# Patient Record
Sex: MALE | Race: WHITE
[De-identification: names, ages, dates, MRNs, and addresses within clinical notes are randomized per-mention and may not be internally consistent; named-entity substitution may affect disease eponyms.]

---

## 2017-04-21 ENCOUNTER — HOSPITAL ENCOUNTER (EMERGENCY)
Dept: HOSPITAL 80 - CED | Age: 47
Discharge: HOME | End: 2017-04-21
Payer: SELF-PAY

## 2017-04-21 VITALS
OXYGEN SATURATION: 93 % | RESPIRATION RATE: 18 BRPM | SYSTOLIC BLOOD PRESSURE: 100 MMHG | DIASTOLIC BLOOD PRESSURE: 65 MMHG | HEART RATE: 86 BPM | TEMPERATURE: 98.6 F

## 2017-04-21 DIAGNOSIS — J18.1: Primary | ICD-10-CM

## 2017-04-21 DIAGNOSIS — Z72.0: ICD-10-CM

## 2017-04-21 NOTE — UCPHY
H & P


Time Seen by Provider: 04/21/17 14:00


Patient Type: New


HPI/ROS: 





CHIEF COMPLAINT:  Cough





HISTORY OF PRESENT ILLNESS:  Patient is a 47-year-old male who presents to the 

emergency department with 2 weeks of worsening cough.  Patient was a [facility] 2 weeks ago after having an alcohol withdrawal seizure and a 

fall.  Patient has not had alcohol since his discharge 2 weeks ago.  He has had 

a persistent cough that is worsening.  He denies chest pain.  Mild shortness of 

breath. No measured fever.  He has no leg pain or swelling.





REVIEW OF SYSTEMS:  


My complete review of systems is negative except as mentioned in the HPI.


Past Medical/Surgical History: 





Includes alcohol abuse, alcohol withdrawal seizure





Past surgical history:  Noncontributory





Social history:  The patient smokes cigarettes


Smoking Status: Current every day smoker


Physical Exam: 





Vitals noted.  O2 saturation 94%


GENERAL:  Well-appearing, in no acute distress, alert.


HEENT:  Eyes normal to inspection, normal pharynx, no signs of dehydration.


NECK:  No thyromegaly, no lymphadenopathy, supple.


RESPIRATORY:  Clear to auscultation bilaterally, no rales, rhonchi or wheezing.


CVS:  Regular rate and rhythm, no rubs, murmurs, or gallops.


ABDOMEN:  Soft, nontender, nondistended, no organomegaly.


BACK:  Normal to inspection, no CVA tenderness.


SKIN:  Normal color, no rash, warm, dry.  No pallor.


EXTREMITIES:  No pedal edema, no calf tenderness, no Homans sign or cords, no 

joint swelling.


NEURO/PSYCH:  Alert and oriented , normal mood and affect, normal motor sensory 

exam.  


Constitutional: 


 Initial Vital Signs











Temperature (C)  36.6 C   04/21/17 13:53


 


Heart Rate  90   04/21/17 13:53


 


Respiratory Rate  15   04/21/17 13:53


 


Blood Pressure  108/60   04/21/17 13:53


 


O2 Sat (%)  94   04/21/17 13:53








 











O2 Delivery Mode               Room Air














Allergies/Adverse Reactions: 


 





No Known Allergies Allergy (Unverified 04/21/17 13:54)


 








Home Medications: 














 Medication  Instructions  Recorded


 


levOFLOXACIN [Levaquin] 750 mg PO DAILY #10 tab 04/21/17














Medical Decision Making





- Diagnostics


Imaging Results: 


 Imaging Impressions





Chest X-Ray  04/21/17 14:09


Impression: Dense pneumonia occupying the superior segment of the left lower 

lobe. Clinical correlation and follow-up to assure complete resolution are 

strongly recommended.


 


A Follow-Up Required test result has been communicated via the [facility] Critical Result system on 4/21/2017 14:25, Message ID [id number].











ED Course/Re-evaluation: 





I discussed possible etiologies with the patient.  Answered all his questions.  

Chest x-ray was ordered.





Chest x-ray:  Patient has a dense infiltrate of the superior left lower lobe.





I discussed the results with the patient.  I answered all his questions.  He 

was given Levaquin 750 mg orally prior to d/c.  Patient was given a 

prescription for Levaquin x 14 days.  He was instructed that he needs close 

follow-up to ensure that his pneumonia resolved.  He may need repeat chest x-

ray imaging.  He is aware this.  He was given follow-up with primary care 

physician.  He is aware he can return to the Urgent Care.  Patient was given 

warnings prior to leaving.  He will return with worsening symptoms.


Differential Diagnosis: 





My differential includes but is not limited to bronchitis, pneumonia, empyema, 

reactive airway disease, COPD, bacteremia, aspiration





Departure





- Departure


Disposition: Home, Routine, Self-Care


Clinical Impression: 


 Cough





Pneumonia


Qualifiers:


 Pneumonia type: due to unspecified organism Laterality: left Lung location: 

lower lobe of lung Qualified Code(s): J18.1 - Lobar pneumonia, unspecified 

organism





Condition: Good


Instructions:  Pneumonia (ED)


Additional Instructions: 


You have left-sided pneumonia.  You have been given antibiotics.  Take the 

entire course of antibiotics.  He needs close follow-up for recheck in 5-10 

days.  Return sooner if her symptoms worsen.


Referrals: 


Juliet Ponce MD [Medical Doctor] - 5-7 days, call for appt.


Prescriptions: 


levOFLOXACIN [Levaquin] 750 mg PO DAILY #10 tab





- PQRS


PQRS Measurement: 








My PQRS negative my PQRS negative my PQRS negative my PQRS negative


134:   Depression screening and followup, PRIME MD-PHQ2  (12 years and older)


Over the last 2 weeks, how often have you been bothered by any of the following 

problems? 


 1. Feeling down, depressed, or hopeless?


2. Little interest or pleasure in doing things? 


Patient answered no to both 1 and 2





130:  Documentation of medications.  


Reviewed all patient medications, doses, route and frequency.








226:  Do you smoke?


Yes.  The patient has been instructed to stop smoking.

## 2017-05-20 ENCOUNTER — HOSPITAL ENCOUNTER (INPATIENT)
Dept: HOSPITAL 80 - CED | Age: 47
LOS: 4 days | Discharge: HOME | DRG: 871 | End: 2017-05-24
Attending: FAMILY MEDICINE | Admitting: INTERNAL MEDICINE
Payer: SELF-PAY

## 2017-05-20 DIAGNOSIS — F10.21: ICD-10-CM

## 2017-05-20 DIAGNOSIS — A41.9: Primary | ICD-10-CM

## 2017-05-20 DIAGNOSIS — J18.8: ICD-10-CM

## 2017-05-20 DIAGNOSIS — J85.1: ICD-10-CM

## 2017-05-20 LAB
% IMMATURE GRANULYOCYTES: 0.9 % (ref 0–1.1)
ABSOLUTE IMMATURE GRANULOCYTES: 0.25 10^3/UL (ref 0–0.1)
ABSOLUTE NRBC COUNT: 0 10^3/UL (ref 0–0.01)
ADD DIFF?: NO
ADD MORPH?: NO
ADD SCAN?: NO
ALBUMIN SERPL-MCNC: 2.6 G/DL (ref 3.5–5)
ALP SERPL-CCNC: 157 IU/L (ref 38–126)
ALT SERPL-CCNC: 28 IU/L (ref 21–72)
ANION GAP SERPL CALC-SCNC: 14 MEQ/L (ref 8–16)
APTT BLD: 44.4 SEC (ref 23–38)
AST SERPL-CCNC: 18 IU/L (ref 17–59)
ATYPICAL LYMPHOCYTE FLAG: 10 (ref 0–99)
BILIRUB SERPL-MCNC: 0.6 MG/DL (ref 0.1–1.4)
CALCIUM SERPL-MCNC: 8.4 MG/DL (ref 8.5–10.4)
CHLORIDE SERPL-SCNC: 96 MEQ/L (ref 97–110)
CO2 SERPL-SCNC: 24 MEQ/L (ref 22–31)
CREAT SERPL-MCNC: 0.9 MG/DL (ref 0.7–1.3)
ERYTHROCYTE [DISTWIDTH] IN BLOOD BY AUTOMATED COUNT: 13.9 % (ref 11.5–15.2)
FRAGMENT RBC FLAG: 0 (ref 0–99)
GFR SERPL CREATININE-BSD FRML MDRD: > 60 ML/MIN/{1.73_M2}
GLUCOSE SERPL-MCNC: 85 MG/DL (ref 70–100)
HCT VFR BLD CALC: 29.1 % (ref 40–51)
HGB BLD-MCNC: 9.7 G/DL (ref 13.7–17.5)
INR PPP: 1.24 (ref 0.83–1.16)
LEFT SHIFT FLG: 40 (ref 0–99)
LIPEMIA HEMOLYSIS FLAG: 80 (ref 0–99)
MCH RBC BLDCO QN: 30.6 PG (ref 27.9–34.1)
MCHC RBC AUTO-ENTMCNC: 33.3 G/DL (ref 32.4–36.7)
MCV RBC AUTO: 91.8 FL (ref 81.5–99.8)
NRBC-AUTO%: 0 % (ref 0–0.2)
PLATELET # BLD EST: (no result) 10*3/UL
PLATELET # BLD: 848 10^3/UL (ref 150–400)
PLATELET CLUMPS FLAG: 0 (ref 0–99)
PMV BLD AUTO: 8.6 FL (ref 8.7–11.7)
POTASSIUM SERPL-SCNC: 4.6 MEQ/L (ref 3.5–5.2)
PROT SERPL-MCNC: 6.1 G/DL (ref 6.3–8.2)
PROTHROMBIN TIME: 15.3 SEC (ref 12–15)
RBC # BLD AUTO: 3.17 10^6/UL (ref 4.4–6.38)
SODIUM SERPL-SCNC: 134 MEQ/L (ref 134–144)

## 2017-05-20 PROCEDURE — C1751 CATH, INF, PER/CENT/MIDLINE: HCPCS

## 2017-05-20 RX ADMIN — IPRATROPIUM BROMIDE AND ALBUTEROL SULFATE SCH ML: .5; 3 SOLUTION RESPIRATORY (INHALATION) at 21:32

## 2017-05-20 RX ADMIN — AMPICILLIN AND SULBACTAM SCH MLS: 2; 1 INJECTION, POWDER, FOR SOLUTION INTRAMUSCULAR; INTRAVENOUS at 23:33

## 2017-05-20 RX ADMIN — ZOLPIDEM TARTRATE PRN MG: 5 TABLET ORAL at 23:59

## 2017-05-20 RX ADMIN — ACETAMINOPHEN PRN MG: 325 TABLET ORAL at 23:58

## 2017-05-20 NOTE — GHP
[f rep st]



                                                            HISTORY AND PHYSICAL





DATE OF ADMISSION:  05/20/2017



CHIEF COMPLAINT:  Shortness of breath, generalized malaise, and fatigue.



HISTORY:  This is a 47-year-old man who has a past medical history of alcohol and polysubstance abus
e, mostly sounding to be in remission, who presents with shortness of breath, generalized malaise, a
nd fatigue going on for at least a couple weeks.  He notes that about 1 month ago he was diagnosed w
ith pneumonia; that was on April 21.  He had a dense lower lobe infiltrate appreciated at that time 
when he was seen in the emergency department.  He was sent home with a 10-day course of Levaquin, wh
ich he completed.  He notes that he initially felt better but then, within several days to a week la
ter, began to feel worse again.  He has had some fevers, though he has not taken his temperature at 
home.  He has had some night sweats.  He has a productive cough with what he describes as brownish w
syed sputum that leaves a funny taste in his mouth.  He denies any known chronic medical issues.  He
 denies any risk factors for HIV.  He denies any recent travel.



PAST MEDICAL HISTORY:  Includes alcohol abuse, and polysubstance abuse.  Of note, he was hospitalize
d at [facility] a couple months ago for altered mental status in the setting of cocaine
, amphetamine, cannabis, and alcohol intoxication.



PAST SURGICAL HISTORY:  Knee surgery x2.  Hand surgery.



SOCIAL HISTORY:  Patient works as a .  He is currently living with his mother.  Alcohol and subs
tance abuse, as per past medical history.  He has had DUIs x2.  He states that he currently has cut 
way down and has had only a couple beers in the last 2 weeks.



FAMILY HISTORY:  Noncontributory.  This was reviewed.



REVIEW OF SYSTEMS:  A 10-point review of systems obtained negative except as per HPI.



HOME MEDICATIONS:  None.



ALLERGIES:  None.



PHYSICAL EXAMINATION:  VITAL SIGNS:  /55, heart rate 93, respiratory rate 16, O2 sats 92% on r
oom air, temperature currently is 37.4 with a T-max of 39.1.  GENERAL APPEARANCE:  This is a chronic
ally ill-appearing young man.  He is awake and alert.  He is in no acute distress.  EYES:  Anicteric
.  HEENT:  Oropharynx is clear.  CARDIOVASCULAR:  Mildly tachy, regular, no MRG.  PULMONARY:  Normal
 work of breathing.  Lungs are clear with decreased breath sounds at the left lower lobes.  ABDOMEN:
  Soft, nontender, nondistended.  EXTREMITIES:  No clubbing, cyanosis, or edema.  SKIN:  Warm, dry, 
and well perfused.  NEURO/PSYCH:  Oriented, appropriate, calm, and pleasant.



CLINICAL DATA:  White blood cell count of 27.5, hemoglobin of 9.7, hematocrit of 29.1, platelets of 
848.  Lactic acid is 0.9.  Chemistry is relatively unremarkable.  Alkaline phosphatase is mildly angel
vated at 157. 



Chest CT personally reviewed and interpreted and discussed with Radiology, notable for an 11.1 cm le
ft lower lobe intraparenchymal abscess with extensive infiltrates involving the left lower lobe, as 
well as mild ground-glass attenuation pneumonitis in left upper lobe.  There is a hepatization of th
e lung and there is a large (11.1 x 8.8 x 6.8 cm) air collection involving much of the left lower lo
be.  Chest x-ray, personally reviewed and interpreted, shows a new left lung abscess with new left b
asilar pneumatocele.



ASSESSMENT/PLAN:  A 47-year-old man with past medical history of polysubstance abuse that appears la
rgely to be in remission, who presents with a lung abscess.

1.  Lung abscess:  This is an associated large pneumatocele.  ID has been consulted by the ER, and t
hey have recommended Unasyn, which has been started already and will be continued.  Blood and sputum
 cultures are pending.  Given the finding of this extremely large air pocket on CT scan, after discu
ssion with Radiology, I have consulted General Surgery, who plans to see the patient in consultation
 and likely will perform some sort of surgical intervention in the morning.  Unclear if simply addre
ssing this air pocket will be sufficient, or if patient will need some sort of lung resection.  This
 was discussed with the patient, and he understands.  He will be n.p.o. after midnight.  HIV serolog
ies have been sent as well, given question if he has some sort of underlying immune compromised stat
e.

2.  Sepsis.  Patient is meeting criteria for sepsis with significantly elevated white blood cell cou
nt, ongoing fever, and tachycardia.  Antibiotics are as per above.  Will trend his CBC while inhouse
.  Cultures, again, are pending.

3.  Hepatosplenomegaly.  This was noted on chest CT.  He does have a mildly elevated alk phosphatase
.  Will trend his LFTs while inhouse.  This may need further workup going forward.  

4.  Alcohol abuse.  Again, the patient admits to being a previously heavy drinker but states he has 
cut down significantly since being diagnosed with pneumonia 1 month ago.  I did review his hospitali
zation records from [facility], at which time he was noted to be intoxicated with alcohol.  Coca
ine, amphetamines, and cannabis, and significantly altered at that time.  His story does correlate w
ith wanting to cut down after this incident.

5.  Anemia/acute-on-chronic.  In reviewing labs from 1 month ago, he had a hematocrit at that time o
f 38.2 and hemoglobin of 13.2.  He does not have anything to suggest acute blood loss based on his h
istory.  I suspect this is likely anemia of chronic inflammation in the setting of lung abscess and 
will trend for now.  

6.  Thrombocytosis.  Again, in the setting of chronic inflammation related to significant lung absce
ss.  

7.  Disposition:  Inpatient status.  Patient will need greater than 48 hours' stay for evaluation an
d management of above given high risk presenting issues. 



Patient is new to my care.  Old records reviewed and summarized as per HPI and past medical history.
  Care plan reviewed with Radiology, General Surgery, and ER doctor.





Job #:  [id number]

## 2017-05-20 NOTE — GCON
[f 
rep st]



                                                                    CONSULTATION





DATE OF CONSULTATION:  05/20/2017



CHIEF COMPLAINT:  Cough.



HISTORY OF PRESENT ILLNESS:  This is a 47-year-old male who presents to the 
emergency department today with worsening shortness of breath and cough.  Per 
the patient's report, he was initially evaluated at an urgent care 
approximately 1 month ago, where he had a left-sided infiltrate, and was placed 
on oral antibiotics.  He states he was placed on a 10-day course of antibiotics 
at that point in time, and he finished all the antibiotics.  He said that they 
helped minimally, but that the cough and the left-sided pain returned, which 
prompted his presentation here today.  He states that he finished his shift at 
his job, and had worsening cough to the point where he could no longer stand 
it.  As part of the workup, he had a chest film which showed an interesting 
finding on the left side, which appeared to be a hyperlucent area in the left 
inferior lobe.  This was followed up with a chest CT scan, which showed 
multiple left-sided pneumatocele or abscesses measuring about 2 cm, but a 
fairly large 11 x 6 cm abscess versus bleb on that inferior portion, which was 
not there at that time.  Patient states that he has had no previous 
instrumentation to the chest.  He does endorse smoking marijuana, and has 
smoked marijuana laced with methamphetamine in the past, but denies having any 
other drug use history.  He denies having any fevers or chills, and actually 
appears well on my examination today.



PAST MEDICAL HISTORY:  Some alcohol use.  Some drug use.  Treated for pneumonia 
approximately a month ago.  States that he has never been hospitalized 
previously, though.



PAST SURGICAL HISTORY:  multiple orthopedic procedures on his knees, but no 
abdominal or chest surgeries in the past.



CURRENT MEDICATIONS:  None.



REVIEW OF SYSTEMS:  A full 10-point review was performed and unless explicitly 
stated above, is otherwise negative.



SOCIAL HISTORY:  Works and lives in [place].  Denies IV drug use.  Smokes 
tobacco and occasionally marijuana.



PHYSICAL EXAM:  VITAL SIGNS:  Temperature 37.4, blood pressure 109/55, and his 
heart rate is 93.  He is currently 92% on room air.  GENERAL:  He is alert and 
oriented, in no acute distress.  CV:  He has a regular rate and rhythm.  LUNGS:
  Distant lung sounds in the inferior left chest, otherwise the lung sounds are 
equal bilaterally and without issue.  ABDOMEN:  Soft, nondistended, and 
nontender.  EXTREMITIES:  Warm and well perfused.



LABS:  Include a leukocytosis to 27,000.  Chemistries are fairly unremarkable.  
Coags show that his INR is minimally elevated at 1.2. 



CT scan, again, confirms the finding of multiple abscesses within the left lung
, and a fairly large 11 x 6 x 8 cm intraparenchymal abscess in the inferior 
portion of the patient's left long.



ASSESSMENT AND PLAN:  A 47-year-old male with what appears to be a necrotizing 
pneumonia, with an interlobar bleb versus abscess.  Given the fact that this 
has developed over the last month, it appears to be a fairly aggressive 
infection. After reviewing the images, I do feel that he would benefit from a 
short course of antibiotics before intervention. Given the size, if it were to 
shrink some it would increase the success of VATs resection, and seeing that he 
is currently stable I do not feel as though he needs emergent intervention.





Job #:  [id number]

MTDD

## 2017-05-20 NOTE — EDPHY
H & P


Stated Complaint: pneumonia


Source: Patient


Exam Limitations: No limitations





- Personal History


Current Tetanus Diphtheria and Acellular Pertussis (TDAP): Yes





- Medical/Surgical History


Hx Asthma: No


Hx Chronic Respiratory Disease: No


Hx Diabetes: No


Hx Cardiac Disease: No


Hx Renal Disease: No


Hx Cirrhosis: No


Hx Alcoholism: No


Hx HIV/AIDS: No


Hx Splenectomy or Spleen Trauma: No


Other PMH: alcohol





- Family History


Significant Family History: No pertinent family hx





- Social History


Smoking Status: Light smoker


Alcohol Use: Sober


Drug Use: None


Time Seen by Provider: 05/20/17 14:21


HPI/ROS: 





This patient reports a cough of 2 weeks duration productive of mucus that he 

describes as yellow.  Over the past 24 hours for the 1st time he noticed some 

blood-tinged mucus.  He notes no other associated symptoms with this.  However, 

he is concerned that he was recently diagnosed with pneumonia here on April 21st with a dense left lower lobe infiltrate, treated with Levaquin antibiotic-

10 days with compliance.  The patient reports that he had resolution of his 

symptoms for 2 weeks prior to the recurrence of the symptoms over the past 10 

days to 2 weeks.





ROS:  No fevers or chills. No other constitutional symptoms


HEENT:  No nasal congestion.  No sore throat. No ear pain.


Neuro:  No headache.  No focal neuro complaints


Pulmonary:  No respiratory distress. No pleuritic pain.


Cardiovascular:  No chest pain, heart palpitations or lightheadedness.  No 

lower extremity swelling or calf pain.


GI:  No nausea or vomiting. No belly pain.


Integumentary:  No skin rash.


10 point ROS is otherwise negative. (Sedrick Rosa)





- Medical/Surgical History


PMH: 





History of alcohol abuse with alcohol withdrawal seizure around April 1, 2017 

seen [facility].  He has been sober since that time. (Sedrick Rosa)





- Social History


Additional Social History: 





He denies any IV drug use (Sedrick Rosa)





- Physical Exam


Exam: 





General Appearance:  Alert, no distress.


Eyes:  Pupils equal and round no pallor or injection.


ENT, Mouth:  Mucous membranes moist.


Respiratory:  There are no retractions, lungs are clear to auscultation.


Cardiovascular:  Regular rate and rhythm. No murmur gallop or rub.  No JVD.  No 

peripheral edema.


Gastrointestinal:  Abdomen is soft and nontender, no masses, bowel sounds 

normal.


Neurological:  GCS 15 with no focal sensory motor deficits.


Skin:  Warm and dry, no rashes. No track marks


Musculoskeletal:  Neck is supple nontender.


Extremities are symmetrical, full range of motion.


Psychiatric:  Mood and affect normal





DIFFERENTIAL DIAGNOSIS:  After history and physical exam differential diagnosis 

was considered for pneumonia, bronchitis, empyema,  pneumothorax (Sedrick Rosa)


Constitutional: 





 Initial Vital Signs











Temperature (C)  36.7 C   05/20/17 14:22


 


Heart Rate  106 H  05/20/17 14:22


 


Respiratory Rate  20   05/20/17 14:22


 


Blood Pressure  109/72   05/20/17 14:22


 


O2 Sat (%)  93   05/20/17 14:22








 











O2 Delivery Mode               Room Air














Allergies/Adverse Reactions: 


 





No Known Allergies Allergy (Unverified 05/20/17 14:21)


 








Home Medications: 














 Medication  Instructions  Recorded


 


NK [No Known Home Meds]  05/20/17














Medical Decision Making





- Diagnostics


Imaging Results: 





 Imaging Impressions





Chest X-Ray  05/20/17 14:26


Impression: 


1. New left lung abscess.


2. New left basilar pneumatocele versus diaphragmatic hernia.


 


Consider CT with IV contrast for further evaluation.


 


Results called to Dr. Carnes at 3:16 PM.


 











ED Course/Re-evaluation: 





Chest x-ray is ordered.





I discussed this patient with Dr. Choi at 3:00 p.m. with chest x-ray 

pending.  She will take over care of this patient including final disposition. (

Sedrick Rosa)


Other Provider: 





Patient endorsed to me at 3:00 p.m. pending chest x-ray


Chest x-ray with left lung abscess and probable pneumatocele, as well as 

infiltrate





Labs including CBC, CMP, PT PTT, lactate, blood cultures





WBC 27


Hemoglobin 9.7


Platelets 848


Blood cultures pending


Lactic acid negative





Patient given IV fluids 2 L normal saline


Acetaminophen 1 g p. o. for fever


Unasyn 3 g IV piggyback





Case discussed with Dr. Michael Bello, infectious disease specialist





Impression


Left lung abscess rule out empyema





Plan


IV antibiotics


CT scan chest with contrast to further elucidate pathology


Admit \[facility] [place] [facility]\""


Discussed with hospitalist-Dr. Luo





BED order placed (Deepika Carnes)





- Data Points


Laboratory Results: 





 Laboratory Results





 05/20/17 15:37 





 05/20/17 15:37 





 











  05/20/17 05/20/17 05/20/17





  15:37 15:37 15:37


 


WBC      27.51 10^3/uL H 10^3/uL





     (3.80-9.50) 


 


RBC      3.17 10^6/uL L 10^6/uL





     (4.40-6.38) 


 


Hgb      9.7 g/dL L g/dL





     (13.7-17.5) 


 


Hct      29.1 % L %





     (40.0-51.0) 


 


MCV      91.8 fL fL





     (81.5-99.8) 


 


MCH      30.6 pg pg





     (27.9-34.1) 


 


MCHC      33.3 g/dL g/dL





     (32.4-36.7) 


 


RDW      13.9 % %





     (11.5-15.2) 


 


Plt Count      848 10^3/uL H 10^3/uL





     (150-400) 


 


MPV      8.6 fL L fL





     (8.7-11.7) 


 


Neut % (Auto)      81.8 % H %





     (39.3-74.2) 


 


Lymph % (Auto)      7.5 % L %





     (15.0-45.0) 


 


Mono % (Auto)      9.5 % %





     (4.5-13.0) 


 


Eos % (Auto)      0.0 % L %





     (0.6-7.6) 


 


Baso % (Auto)      0.3 % %





     (0.3-1.7) 


 


Nucleat RBC Rel Count      0.0 % %





     (0.0-0.2) 


 


Absolute Neuts (auto)      22.49 10^3/uL H 10^3/uL





     (1.70-6.50) 


 


Absolute Lymphs (auto)      2.05 10^3/uL 10^3/uL





     (1.00-3.00) 


 


Absolute Monos (auto)      2.62 10^3/uL H 10^3/uL





     (0.30-0.80) 


 


Absolute Eos (auto)      0.01 10^3/uL L 10^3/uL





     (0.03-0.40) 


 


Absolute Basos (auto)      0.09 10^3/uL 10^3/uL





     (0.02-0.10) 


 


Absolute Nucleated RBC      0.00 10^3/uL 10^3/uL





     (0-0.01) 


 


Immature Gran %      0.9 % %





     (0.0-1.1) 


 


Immature Gran #      0.25 10^3/uL H 10^3/uL





     (0.00-0.10) 


 


Platelet Estimate      INCREASED  H 





     (ADEQ) 


 


PT  15.3 SEC H SEC    





   (12.0-15.0)   


 


INR  1.24  H     





   (0.83-1.16)   


 


APTT  44.4 SEC H SEC    





   (23.0-38.0)   


 


VBG Lactic Acid      





    


 


Sodium    134 mEq/L mEq/L  





    (134-144)  


 


Potassium    4.6 mEq/L mEq/L  





    (3.5-5.2)  


 


Chloride    96 mEq/L L mEq/L  





    ()  


 


Carbon Dioxide    24 mEq/l mEq/l  





    (22-31)  


 


Anion Gap    14 mEq/L mEq/L  





    (8-16)  


 


BUN    12 mg/dL mg/dL  





    (7-23)  


 


Creatinine    0.9 mg/dL mg/dL  





    (0.7-1.3)  


 


Estimated GFR    > 60   





    


 


Glucose    85 mg/dL mg/dL  





    ()  


 


Calcium    8.4 mg/dL L mg/dL  





    (8.5-10.4)  


 


Total Bilirubin    0.6 mg/dL mg/dL  





    (0.1-1.4)  


 


AST    18 IU/L IU/L  





    (17-59)  


 


ALT    28 IU/L IU/L  





    (21-72)  


 


Alkaline Phosphatase    157 IU/L H IU/L  





    ()  


 


Total Protein    6.1 g/dL L g/dL  





    (6.3-8.2)  


 


Albumin    2.6 g/dL L g/dL  





    (3.5-5.0)  


 


Lipase    35.0 IU/L IU/L  





    ()  














  05/20/17





  15:37


 


WBC  





  


 


RBC  





  


 


Hgb  





  


 


Hct  





  


 


MCV  





  


 


MCH  





  


 


MCHC  





  


 


RDW  





  


 


Plt Count  





  


 


MPV  





  


 


Neut % (Auto)  





  


 


Lymph % (Auto)  





  


 


Mono % (Auto)  





  


 


Eos % (Auto)  





  


 


Baso % (Auto)  





  


 


Nucleat RBC Rel Count  





  


 


Absolute Neuts (auto)  





  


 


Absolute Lymphs (auto)  





  


 


Absolute Monos (auto)  





  


 


Absolute Eos (auto)  





  


 


Absolute Basos (auto)  





  


 


Absolute Nucleated RBC  





  


 


Immature Gran %  





  


 


Immature Gran #  





  


 


Platelet Estimate  





  


 


PT  





  


 


INR  





  


 


APTT  





  


 


VBG Lactic Acid  0.9 mmol/L mmol/L





   (0.7-2.1) 


 


Sodium  





  


 


Potassium  





  


 


Chloride  





  


 


Carbon Dioxide  





  


 


Anion Gap  





  


 


BUN  





  


 


Creatinine  





  


 


Estimated GFR  





  


 


Glucose  





  


 


Calcium  





  


 


Total Bilirubin  





  


 


AST  





  


 


ALT  





  


 


Alkaline Phosphatase  





  


 


Total Protein  





  


 


Albumin  





  


 


Lipase  





  











Medications Given: 





 








Discontinued Medications





Acetaminophen (Tylenol)  325 mg PO EDNOW ONE


   Stop: 05/20/17 16:32


   Last Admin: 05/20/17 16:32 Dose:  325 mg


Acetaminophen (Tylenol)  650 mg PO EDNOW ONE


   Stop: 05/20/17 16:32


   Last Admin: 05/20/17 16:33 Dose:  650 mg


Sodium Chloride (Ns)  1,000 mls @ 0 mls/hr IV ONCE ONE


   PRN Reason: Wide Open


   Stop: 05/20/17 15:31


   Last Admin: 05/20/17 15:41 Dose:  1,000 mls


Ampicillin Sodium/Sulbactam (Sodium 3 gm/ Sodium Chloride)  100 mls @ 200 mls/

hr IV EDNOW ONE


   PRN Reason: Protocol


   Stop: 05/20/17 16:41


   Last Admin: 05/20/17 16:30 Dose:  100 mls


Sodium Chloride (Ns)  1,000 mls @ 0 mls/hr IV ONCE ONE


   PRN Reason: Wide Open


   Stop: 05/20/17 16:34


   Last Admin: 05/20/17 16:37 Dose:  1,000 mls








Departure





- Departure


Disposition: [facility] Inpatient Acute


Clinical Impression: 


 Abscess of left lung with pneumonia





Condition: Good

## 2017-05-21 LAB
% IMMATURE GRANULYOCYTES: 0.6 % (ref 0–1.1)
ABSOLUTE IMMATURE GRANULOCYTES: 0.15 10^3/UL (ref 0–0.1)
ABSOLUTE NRBC COUNT: 0 10^3/UL (ref 0–0.01)
ADD DIFF?: NO
ADD MORPH?: NO
ADD SCAN?: NO
ALBUMIN SERPL-MCNC: 2.4 G/DL (ref 3.5–5)
ALP SERPL-CCNC: 137 IU/L (ref 38–126)
ALT SERPL-CCNC: 25 IU/L (ref 21–72)
ANION GAP SERPL CALC-SCNC: 9 MEQ/L (ref 8–16)
AST SERPL-CCNC: 16 IU/L (ref 17–59)
ATYPICAL LYMPHOCYTE FLAG: 10 (ref 0–99)
BILIRUB SERPL-MCNC: 0.6 MG/DL (ref 0.1–1.4)
BILIRUBIN-CONJUGATED: 0.4 MG/DL (ref 0–0.5)
BILIRUBIN-UNCONJUGATED: 0.2 MG/DL (ref 0–1.1)
CALCIUM SERPL-MCNC: 8.5 MG/DL (ref 8.5–10.4)
CHLORIDE SERPL-SCNC: 104 MEQ/L (ref 97–110)
CO2 SERPL-SCNC: 24 MEQ/L (ref 22–31)
CREAT SERPL-MCNC: 0.6 MG/DL (ref 0.7–1.3)
ERYTHROCYTE [DISTWIDTH] IN BLOOD BY AUTOMATED COUNT: 14.1 % (ref 11.5–15.2)
FRAGMENT RBC FLAG: 0 (ref 0–99)
GFR SERPL CREATININE-BSD FRML MDRD: > 60 ML/MIN/{1.73_M2}
GLUCOSE SERPL-MCNC: 87 MG/DL (ref 70–100)
HCT VFR BLD CALC: 30.4 % (ref 40–51)
HGB BLD-MCNC: 9.7 G/DL (ref 13.7–17.5)
LEFT SHIFT FLG: 30 (ref 0–99)
LIPEMIA HEMOLYSIS FLAG: 80 (ref 0–99)
MCH RBC BLDCO QN: 30.6 PG (ref 27.9–34.1)
MCHC RBC AUTO-ENTMCNC: 31.9 G/DL (ref 32.4–36.7)
MCV RBC AUTO: 95.9 FL (ref 81.5–99.8)
NRBC-AUTO%: 0 % (ref 0–0.2)
PLATELET # BLD: 694 10^3/UL (ref 150–400)
PLATELET CLUMPS FLAG: 0 (ref 0–99)
PMV BLD AUTO: 9.5 FL (ref 8.7–11.7)
POTASSIUM SERPL-SCNC: 4.7 MEQ/L (ref 3.5–5.2)
PROT SERPL-MCNC: 5.3 G/DL (ref 6.3–8.2)
RBC # BLD AUTO: 3.17 10^6/UL (ref 4.4–6.38)
SODIUM SERPL-SCNC: 137 MEQ/L (ref 134–144)

## 2017-05-21 RX ADMIN — OXYCODONE HYDROCHLORIDE PRN MG: 15 TABLET ORAL at 12:08

## 2017-05-21 RX ADMIN — IPRATROPIUM BROMIDE AND ALBUTEROL SULFATE SCH ML: .5; 3 SOLUTION RESPIRATORY (INHALATION) at 11:27

## 2017-05-21 RX ADMIN — AMPICILLIN AND SULBACTAM SCH: 2; 1 INJECTION, POWDER, FOR SOLUTION INTRAMUSCULAR; INTRAVENOUS at 08:05

## 2017-05-21 RX ADMIN — AMPICILLIN AND SULBACTAM SCH MLS: 2; 1 INJECTION, POWDER, FOR SOLUTION INTRAMUSCULAR; INTRAVENOUS at 12:08

## 2017-05-21 RX ADMIN — OXYCODONE HYDROCHLORIDE PRN MG: 15 TABLET ORAL at 17:35

## 2017-05-21 RX ADMIN — ZOLPIDEM TARTRATE PRN MG: 5 TABLET ORAL at 22:24

## 2017-05-21 RX ADMIN — IPRATROPIUM BROMIDE AND ALBUTEROL SULFATE SCH: .5; 3 SOLUTION RESPIRATORY (INHALATION) at 08:05

## 2017-05-21 RX ADMIN — AMPICILLIN AND SULBACTAM SCH MLS: 2; 1 INJECTION, POWDER, FOR SOLUTION INTRAMUSCULAR; INTRAVENOUS at 17:29

## 2017-05-21 RX ADMIN — OXYCODONE HYDROCHLORIDE PRN MG: 15 TABLET ORAL at 22:24

## 2017-05-21 RX ADMIN — IPRATROPIUM BROMIDE AND ALBUTEROL SULFATE SCH ML: .5; 3 SOLUTION RESPIRATORY (INHALATION) at 20:36

## 2017-05-21 RX ADMIN — IPRATROPIUM BROMIDE AND ALBUTEROL SULFATE SCH ML: .5; 3 SOLUTION RESPIRATORY (INHALATION) at 16:59

## 2017-05-21 NOTE — GCON
[f rep st]



                                                                    CONSULTATION





INPATIENT INFECTIOUS DISEASE CONSULTATION



REFERRING PHYSICIAN:  Deepika Carnes MD



REASON FOR REFERRAL:  Necrotizing pneumonia, left side, with bleb and possible pulmonary abscess.



HISTORY OF PRESENT ILLNESS:  Patient is a 47-year-old male, who sought medical care at our outlying 
emergency room on 05/20/2017.  He had been complaining of cough producing brownish sputum over the p
ast 2 weeks or more.  He has also noticed occasional blood in the sputum as well.  A few pounds of w
eight loss.  He was seen previously in late April and diagnosed with a left lower lobe pneumonia and
 was treated with Levaquin for 10 days.  The patient reports that this improved his symptoms then, b
ut he recurred with symptoms approximately 2 weeks later.  The patient obtained a chest CT as part o
f his workup, which showed an 11.1 cm intraparenchymal abscess in the left lower lobe.  It also show
ed extensive infiltrates.  He was admitted and started on Unasyn 3 g IV q.6 hours.  He is resting co
mfortably in his hospital bed this morning.  No new complaint.  He now is just having a fever overni
ght.  Surgery has already consulted on the patient.  Plan is for a few days of antibiotics prior to 
consideration for video-assisted thoracoscopy.



PAST MEDICAL HISTORY:  Polysubstance abuse.



PAST SURGICAL HISTORY:  

1.  Status post hand surgery.

2.  Status post knee surgery.



ANTIBIOTICS:  Unasyn.



ALLERGIES:  No known medical allergies.



SOCIAL HISTORY:  The patient works as a .  History of alcohol and substance abuse.



FAMILY HISTORY:  Reviewed but noncontributory.



REVIEW OF SYSTEMS:  Other than that detailed above in History of Present Illness, comprehensive 10-s
ystem review is negative.



PHYSICAL EXAMINATION:  VITAL SIGNS:  Temperature maximum is 39.5, temperature current is 37.1, heart
 rate is 91, respiratory rate is 17, blood pressure is 110/62.  GENERAL:  The patient is a well-form
ed, thin, middle-aged male, in no acute distress.  He is not toxic in appearance.  He is alert and o
riented x3.  He is in a pleasant demeanor.  HEENT:  Normocephalic for age.  Atraumatic.  No scleral 
icterus.  No oral lesion or drainage from the nares. 

Eyes, lids, and conjunctivae within normal limits.  Pupils are equal and round bilaterally.  NECK:  
Supple.  No meningismus.  LUNGS:  Clear to auscultation on the right.  The patient has markedly decr
eased breath sounds in the left mid lung and lower lung fields.  Good effort.  The patient has pleur
itic pain with deep breathing on the left side.  HEART:  Regular rate and rhythm.  No murmur, rub, o
r gallop noted.  No significant peripheral edema.  ABDOMEN:  Soft, nontender.  No masses.  SKIN:  Wa
rm and dry to the touch.  No rash or lesions noted.  MUSCULOSKELETAL:  No muscle tenderness is noted
.  No joint line effusion or arthritis is seen.  NEURO:  Cranial nerves 2-12 seem to be intact.  Per
ipheral sensation seems intact in the extremities.



LABORATORY DATA:  Patient has a CBC dated 05/21/2017, shows a white blood cell count of 26.3, hemogl
obin of 9.7, hematocrit of 30.4, platelet count of 694.  Differential shows mild left shift of 82% s
egmented neutrophils.  Serum chemistries are all within normal limits.  Creatinine is 0.6.  AST is 1
6, ALT is 25.



MICROBIOLOGIC DATA:  The patient has blood cultures dated 05/20/2017, which are pending.



RADIOLOGIC DATA:  Patient has a chest CT dated 05/20/2017, which shows an 11.1 cm left lower lobe in
traparenchymal abscess with extensive infiltrates involving the left lower lobe, as well as some mil
d ground-glass attenuation pneumonitis in the left upper lobe.  It shows mediastinal and left hilar 
lymphadenitis, no diaphragmatic hernia, a small left pleural effusion.



ASSESSMENT:  Left lung necrotizing pneumonia with abscess.  Will use Unasyn monotherapy at present t
chandrakant.  I suspect this is instigated by an aspiration event with his alcohol and other substance abuse
 history.  We will try to obtain a sputum culture if he is able to produce good sputum.



PLAN:  

1.  Continue Unasyn.

2.  Try to obtain sputum culture.

3.  Follow clinical improvement and surgical plans.





Job #:  [id number]

## 2017-05-21 NOTE — HOSPPROG
Hospitalist Progress Note


Assessment/Plan: 





# acute Necrotizing PNA with abscess - CT (personally reviewed and interpreted) 

left sided infiltrate with large abscess


   oxygen saturations 93% on RA - pleuritic pain with deep inspiration


   - cont Unasyn IV


   - Blodd cultures NGTD


   - surgery consulting for anticipated VATS





# Sepsis - fever with WBC > 25 source pulmonary- Blood cultures NGTD


   - received IVF on admit


   - cont empiric abx





# Fever - 2/2 above cont current care





# h/o Etoh abuse/polysubstance abuse - recent seizures and suspected aspiration 

events likely contributing to current necrotizing PNA


   pt reports abstinence since 4/1 - no active signs of withdrawal


   - cont to monitor closely





# proph - lovenox


# diet - regular


# dispo - > 2MN as will require surgical intervention for necrotizing PNA





I have discussed the case with ID - cont tx with Unasyn and monitor cultures








Subjective: pain with inspiration


Objective: 


 Vital Signs











Temp Pulse Resp BP Pulse Ox


 


 37.1 C   91   17   110/62   93 


 


 05/21/17 08:00  05/21/17 11:27  05/21/17 11:27  05/21/17 08:00  05/21/17 11:27








 Laboratory Results





 05/21/17 04:15 





 05/21/17 04:15 





 











 05/20/17 05/21/17 05/22/17





 05:59 05:59 05:59


 


Intake Total  2100 420


 


Output Total  0 300


 


Balance  2100 120








 











PT  15.3 SEC (12.0-15.0)  H  05/20/17  15:37    


 


INR  1.24  (0.83-1.16)  H  05/20/17  15:37    














- Physical Exam


Constitutional: appears nourished


Eyes: anicteric sclera


Ears, Nose, Mouth, Throat: moist mucous membranes


Cardiovascular: regular rate and rhythym


Respiratory: no respiratory distress, inspiratory crackles, rhonchi


Gastrointestinal: normoactive bowel sounds


Genitourinary: no bladder fullness


Skin: warm, normal color


Musculoskeletal: No asymmetric calves


Neurologic: AAOx3


Psychiatric: interacting appropriately, not anxious


Lymph, Heme, Immunologic: no cervical LAD





ICD10 Worksheet


Patient Problems: 


 Problems











Problem Status Onset


 


Abscess of left lung with pneumonia Acute

## 2017-05-22 LAB
% IMMATURE GRANULYOCYTES: 0.7 % (ref 0–1.1)
ABSOLUTE IMMATURE GRANULOCYTES: 0.11 10^3/UL (ref 0–0.1)
ABSOLUTE NRBC COUNT: 0 10^3/UL (ref 0–0.01)
ADD DIFF?: NO
ADD MORPH?: NO
ADD SCAN?: NO
ATYPICAL LYMPHOCYTE FLAG: 0 (ref 0–99)
ERYTHROCYTE [DISTWIDTH] IN BLOOD BY AUTOMATED COUNT: 14 % (ref 11.5–15.2)
FRAGMENT RBC FLAG: 0 (ref 0–99)
HCT VFR BLD CALC: 27.3 % (ref 40–51)
HGB BLD-MCNC: 8.9 G/DL (ref 13.7–17.5)
LEFT SHIFT FLG: 40 (ref 0–99)
LIPEMIA HEMOLYSIS FLAG: 80 (ref 0–99)
MCH RBC BLDCO QN: 30.3 PG (ref 27.9–34.1)
MCHC RBC AUTO-ENTMCNC: 32.6 G/DL (ref 32.4–36.7)
MCV RBC AUTO: 92.9 FL (ref 81.5–99.8)
NRBC-AUTO%: 0 % (ref 0–0.2)
PLATELET # BLD: 600 10^3/UL (ref 150–400)
PLATELET CLUMPS FLAG: 0 (ref 0–99)
PMV BLD AUTO: 8.9 FL (ref 8.7–11.7)
RBC # BLD AUTO: 2.94 10^6/UL (ref 4.4–6.38)

## 2017-05-22 RX ADMIN — OXYCODONE HYDROCHLORIDE PRN MG: 15 TABLET ORAL at 22:15

## 2017-05-22 RX ADMIN — IPRATROPIUM BROMIDE AND ALBUTEROL SULFATE SCH ML: .5; 3 SOLUTION RESPIRATORY (INHALATION) at 11:26

## 2017-05-22 RX ADMIN — IPRATROPIUM BROMIDE AND ALBUTEROL SULFATE SCH ML: .5; 3 SOLUTION RESPIRATORY (INHALATION) at 21:33

## 2017-05-22 RX ADMIN — IPRATROPIUM BROMIDE AND ALBUTEROL SULFATE SCH ML: .5; 3 SOLUTION RESPIRATORY (INHALATION) at 16:49

## 2017-05-22 RX ADMIN — ZOLPIDEM TARTRATE PRN MG: 5 TABLET ORAL at 22:15

## 2017-05-22 RX ADMIN — AMPICILLIN AND SULBACTAM SCH MLS: 2; 1 INJECTION, POWDER, FOR SOLUTION INTRAMUSCULAR; INTRAVENOUS at 16:59

## 2017-05-22 RX ADMIN — OXYCODONE HYDROCHLORIDE PRN MG: 15 TABLET ORAL at 11:37

## 2017-05-22 RX ADMIN — AMPICILLIN AND SULBACTAM SCH MLS: 2; 1 INJECTION, POWDER, FOR SOLUTION INTRAMUSCULAR; INTRAVENOUS at 05:45

## 2017-05-22 RX ADMIN — AMPICILLIN AND SULBACTAM SCH MLS: 2; 1 INJECTION, POWDER, FOR SOLUTION INTRAMUSCULAR; INTRAVENOUS at 23:53

## 2017-05-22 RX ADMIN — AMPICILLIN AND SULBACTAM SCH MLS: 2; 1 INJECTION, POWDER, FOR SOLUTION INTRAMUSCULAR; INTRAVENOUS at 11:35

## 2017-05-22 RX ADMIN — AMPICILLIN AND SULBACTAM SCH MLS: 2; 1 INJECTION, POWDER, FOR SOLUTION INTRAMUSCULAR; INTRAVENOUS at 00:40

## 2017-05-22 RX ADMIN — IPRATROPIUM BROMIDE AND ALBUTEROL SULFATE SCH ML: .5; 3 SOLUTION RESPIRATORY (INHALATION) at 05:47

## 2017-05-22 NOTE — PCMIDPN
Assessment/Plan: 


Assessment/Plan:


* Necrotizing pneumonia/lung abscess:  Most likely odontogenic vicki/aspiration 

as etiology.  CT reviewed with question of loculated pleural fluid superiorly.  

This is difficult to distinguish from dense consolidation although appearance 

suggestive that this may be loculated fluid.  Typically could treat lung 

abscess with oral antibiotics but based on significant consolidation which is 

likely necrotizing think he will require initial course with IV treatment.  

Continue Unasyn while hospitalized with probable transition to ertapenem daily 

at for outpatient treatment with continued imaging over time to assess response 

to medical therapy.  If CT findings represent loculated fluid, this ultimately 

may require drainage.





05/22/17 19:22





05/22/17 19:25





Subjective: 





Patient feels significantly improved.  Still with intermittent cough and chest 

discomfort.


Objective: 


 Vital Signs











Temp Pulse Resp BP Pulse Ox


 


 36.9 C   88   18   116/65   93 


 


 05/22/17 15:53  05/22/17 16:45  05/22/17 16:45  05/22/17 15:53  05/22/17 16:45








 Microbiology











 05/21/17 17:30  - Final





 Sputum, Expectorated 








 Laboratory Results





 05/22/17 04:12 





 05/21/17 04:15 





 











 05/21/17 05/22/17 05/23/17





 05:59 05:59 05:59


 


Intake Total 2100 1960 1100


 


Output Total 0 300 


 


Balance 2100 1660 1100








Unasyn # 2


Sputum with 4+ white blood cells and rare GPCs with culture pending


Blood cultures pending


CT scan of chest reviewed with Radiology today showing evidence of abscess, 

necrotizing pneumonia, and question loculated pleural fluid superiorly


HIV antibody negative





- Physical Exam


General Appearance: alert, no apparent distress, thin


EENT: poor dentition, No thrush


Respiratory: other (Decreased breath sounds significant portion of left lung 

field)


Cardiac/Chest: regular rate, rhythm, systolic murmur (2/6 left upper sternal 

border)


Abdomen: non-tender, No distended


Skin: No embolic lesions





ICD10 Worksheet


Patient Problems: 


 Problems











Problem Status Onset


 


Abscess of left lung with pneumonia Acute

## 2017-05-22 NOTE — HOSPPROG
Hospitalist Progress Note


Assessment/Plan: 





46 y/o malenew to my care  with history of substance abuse and pneumonia 1 

month ago treated with 10days of Levaquin presents with:





# acute Necrotizing PNA with abscess - CT (personally reviewed and interpreted) 

left sided infiltrate with large abscess


   oxygen saturations 93% on RA - pleuritic pain with deep inspiration


   - cont Unasyn IV


   - Blood cultures NGTD


   - surgery consulting for anticipated VATS





# Sepsis (improving)- fever with WBC > 25 source pulmonary- Blood cultures NGTD


   - received IVF on admit


   - cont empiric abx





# Fever - 2/2 above cont current care





# h/o Etoh abuse/polysubstance abuse - recent seizures and suspected aspiration 

events likely contributing to current necrotizing PNA


   pt reports abstinence since 4/1 - no active signs of withdrawal


   - cont to monitor closely





# proph - lovenox


# diet - regular


# dispo - > 2MN as will require surgical intervention for necrotizing PNA





cont tx with Unasyn and monitor cultures








Subjective: no shortness of breath.  continues to have left sided chest pain 

with deep inspiration.  no fever or chills


Objective: 


 Vital Signs











Temp Pulse Resp BP Pulse Ox


 


 37.2 C   95   16   107/62   94 


 


 05/22/17 07:33  05/22/17 07:33  05/22/17 07:33  05/22/17 07:33  05/22/17 07:33








 Microbiology











 05/21/17 17:30  - Final





 Sputum, Expectorated 








 Laboratory Results





 05/22/17 04:12 





 05/21/17 04:15 





 











 05/21/17 05/22/17 05/23/17





 05:59 05:59 05:59


 


Intake Total 2100 1960 


 


Output Total 0 300 


 


Balance 2100 1660 








 











PT  15.3 SEC (12.0-15.0)  H  05/20/17  15:37    


 


INR  1.24  (0.83-1.16)  H  05/20/17  15:37    








I visualized and personally interpreted the cxr done today showing persistent 

LLL pneumonia/abscess





- Physical Exam


Constitutional: no apparent distress, appears nourished, not in pain


Cardiovascular: regular rate and rhythym, no murmur, rub, or gallop


Respiratory: no respiratory distress, no rales or rhonchi, reduced air movement 

(left lung field), No expiratory wheeze


Gastrointestinal: normoactive bowel sounds, soft, non-tender abdomen, no 

palpable masses, No guarding, No rebound


Genitourinary: no bladder fullness, no bladder tenderness, no renal bruits


Skin: no rashes or abrasions, no fluctuance, no induration, other (multiple 

tattoos )





ICD10 Worksheet


Patient Problems: 


 Problems











Problem Status Onset


 


Abscess of left lung with pneumonia Acute

## 2017-05-22 NOTE — SOAPPROG
SOAP Progress Note


Assessment/Plan: 


Assessment/Plan:


- 46yo M c L sided pna, abscesses, large pneumatocele


- VSS, HDS


- WBC downtrending, now 16 today and he has been afebrile


- Lungs sound clear, distant at L base which is c/w previous


- CXR (images reviewed by me) shows that the pneumatocele is smaller and 

improved from previous


- Discussed the case with my partner Dr Ricardo who agrees that we should hold 

off on surgery for now, cont to follow as he is imroving from both imaging and 

clinical standpoiunt. OK for d/c in next day or so. Will need to f/u with me to 

have repeat imaging performed at that time. I coordinated this plan with Dr Montes as well


05/22/17 14:59





Objective: 





 Vital Signs











Temp Pulse Resp BP Pulse Ox


 


 37.2 C   86   18   107/62   94 


 


 05/22/17 07:33  05/22/17 11:25  05/22/17 11:25  05/22/17 07:33  05/22/17 11:25








 Microbiology











 05/21/17 17:30  - Final





 Sputum, Expectorated 








 Laboratory Results





 05/22/17 04:12 





 05/21/17 04:15 





 











 05/21/17 05/22/17 05/23/17





 05:59 05:59 05:59


 


Intake Total 2100 1960 500


 


Output Total 0 300 


 


Balance 2100 1660 500








 











PT  15.3 SEC (12.0-15.0)  H  05/20/17  15:37    


 


INR  1.24  (0.83-1.16)  H  05/20/17  15:37    














ICD10 Worksheet


Patient Problems: 


 Problems











Problem Status Onset


 


Abscess of left lung with pneumonia Acute

## 2017-05-23 LAB
% IMMATURE GRANULYOCYTES: 0.8 % (ref 0–1.1)
ABSOLUTE IMMATURE GRANULOCYTES: 0.1 10^3/UL (ref 0–0.1)
ABSOLUTE NRBC COUNT: 0 10^3/UL (ref 0–0.01)
ADD DIFF?: NO
ADD MORPH?: NO
ADD SCAN?: NO
ATYPICAL LYMPHOCYTE FLAG: 20 (ref 0–99)
ERYTHROCYTE [DISTWIDTH] IN BLOOD BY AUTOMATED COUNT: 14.2 % (ref 11.5–15.2)
FRAGMENT RBC FLAG: 0 (ref 0–99)
HCT VFR BLD CALC: 26.6 % (ref 40–51)
HGB BLD-MCNC: 8.5 G/DL (ref 13.7–17.5)
LEFT SHIFT FLG: 10 (ref 0–99)
LIPEMIA HEMOLYSIS FLAG: 80 (ref 0–99)
MCH RBC BLDCO QN: 29.9 PG (ref 27.9–34.1)
MCHC RBC AUTO-ENTMCNC: 32 G/DL (ref 32.4–36.7)
MCV RBC AUTO: 93.7 FL (ref 81.5–99.8)
NRBC-AUTO%: 0 % (ref 0–0.2)
PLATELET # BLD: 589 10^3/UL (ref 150–400)
PLATELET CLUMPS FLAG: 10 (ref 0–99)
PMV BLD AUTO: 9.4 FL (ref 8.7–11.7)
RBC # BLD AUTO: 2.84 10^6/UL (ref 4.4–6.38)

## 2017-05-23 PROCEDURE — 02HV33Z INSERTION OF INFUSION DEVICE INTO SUPERIOR VENA CAVA, PERCUTANEOUS APPROACH: ICD-10-PCS | Performed by: RADIOLOGY

## 2017-05-23 RX ADMIN — IPRATROPIUM BROMIDE AND ALBUTEROL SULFATE SCH ML: .5; 3 SOLUTION RESPIRATORY (INHALATION) at 16:27

## 2017-05-23 RX ADMIN — AMPICILLIN AND SULBACTAM SCH MLS: 2; 1 INJECTION, POWDER, FOR SOLUTION INTRAMUSCULAR; INTRAVENOUS at 17:28

## 2017-05-23 RX ADMIN — IPRATROPIUM BROMIDE AND ALBUTEROL SULFATE SCH ML: .5; 3 SOLUTION RESPIRATORY (INHALATION) at 05:40

## 2017-05-23 RX ADMIN — AMPICILLIN AND SULBACTAM SCH MLS: 2; 1 INJECTION, POWDER, FOR SOLUTION INTRAMUSCULAR; INTRAVENOUS at 23:04

## 2017-05-23 RX ADMIN — AMPICILLIN AND SULBACTAM SCH MLS: 2; 1 INJECTION, POWDER, FOR SOLUTION INTRAMUSCULAR; INTRAVENOUS at 12:07

## 2017-05-23 RX ADMIN — IPRATROPIUM BROMIDE AND ALBUTEROL SULFATE SCH ML: .5; 3 SOLUTION RESPIRATORY (INHALATION) at 11:48

## 2017-05-23 RX ADMIN — ACETAMINOPHEN PRN MG: 325 TABLET ORAL at 23:03

## 2017-05-23 RX ADMIN — IPRATROPIUM BROMIDE AND ALBUTEROL SULFATE SCH ML: .5; 3 SOLUTION RESPIRATORY (INHALATION) at 21:19

## 2017-05-23 RX ADMIN — AMPICILLIN AND SULBACTAM SCH MLS: 2; 1 INJECTION, POWDER, FOR SOLUTION INTRAMUSCULAR; INTRAVENOUS at 05:42

## 2017-05-23 NOTE — HOSPPROG
Hospitalist Progress Note


Assessment/Plan: 





48 y/o male new to my care 5/22/17 with history of substance abuse and 

pneumonia 1 month ago treated with 10 days of Levaquin presents with:





# acute Necrotizing PNA with abscess - CT (personally reviewed and interpreted) 

left sided infiltrate with large abscess


   oxygen saturations 93% on RA - pleuritic pain with deep inspiration


   - cont Unasyn IV


   - Blood cultures NGTD


   - I discussed the case with Dr. Carrera who is not recommending surgery 

at this time


   - agree with PICC line and plan for outpatient abx





# Sepsis (improving)- fever with WBC > 25 source pulmonary- Blood cultures NGTD


   - received IVF on admit


   - cont empiric abx





# Fever - 2/2 above cont current care





# h/o Etoh abuse/polysubstance abuse - recent seizures and suspected aspiration 

events likely contributing to current necrotizing PNA


   pt reports abstinence since 4/1 - no active signs of withdrawal


   - cont to monitor closely





# proph - lovenox


# diet - regular


# dispo - > 2MN as will require surgical intervention for necrotizing PNA





cont tx with Unasyn and monitor cultures








Subjective: improving pain with deep inspiration.  no shortness of breath.  no 

fever or chills


Objective: 


 Vital Signs











Temp Pulse Resp BP Pulse Ox


 


 36.8 C   84   18   114/62   94 


 


 05/23/17 07:38  05/23/17 11:45  05/23/17 11:45  05/23/17 07:38  05/23/17 11:45








 Microbiology











 05/21/17 17:30  - Final





 Sputum, Expectorated 








 Laboratory Results





 05/23/17 04:11 





 05/21/17 04:15 





 











 05/22/17 05/23/17 05/24/17





 05:59 05:59 05:59


 


Intake Total 1960 1600 


 


Output Total 300 600 


 


Balance 1660 1000 








 











PT  15.3 SEC (12.0-15.0)  H  05/20/17  15:37    


 


INR  1.24  (0.83-1.16)  H  05/20/17  15:37    














- Physical Exam


Constitutional: no apparent distress, appears nourished, not in pain


Ears, Nose, Mouth, Throat: moist mucous membranes, hearing normal, ears appear 

normal, no oral mucosal ulcers


Cardiovascular: regular rate and rhythym, no murmur, rub, or gallop


Respiratory: no respiratory distress, no rales or rhonchi, clear to auscultation

, reduced air movement (left base)


Gastrointestinal: normoactive bowel sounds, soft, non-tender abdomen, no 

palpable masses





ICD10 Worksheet


Patient Problems: 


 Problems











Problem Status Onset


 


Abscess of left lung with pneumonia Acute

## 2017-05-23 NOTE — PCMIDPN
Assessment/Plan: 


Assessment/Plan:








1. Necrotizing pneumonia/lung abscess:


- Ct chest reviewed. care reviewed with surgery. Plan is to hold off on surgery 

for now.


-Continue with IV Unasyn with eventual change to invanz for OP continued 

treatment.


-Discussed about picc line. Will order picc


-Will likely need between prolonged course of treatment either all IV route or 

partially IV then PO.


-will need f/u imaging as well.


-plan reviewed with patient.





Meds


unasyn





Subjective: 





Afebrile. Less sob. productive cough. denies abd pain or diarrhea. 


Objective: 


 Vital Signs











Temp Pulse Resp BP Pulse Ox


 


 36.8 C   82   18   114/62   96 


 


 05/23/17 07:38  05/23/17 07:38  05/23/17 07:38  05/23/17 07:38  05/23/17 07:38








 Microbiology











 05/21/17 17:30  - Final





 Sputum, Expectorated 








 Laboratory Results





 05/23/17 04:11 





 05/21/17 04:15 





 











 05/22/17 05/23/17 05/24/17





 05:59 05:59 05:59


 


Intake Total 1960 1600 


 


Output Total 300 600 


 


Balance 1660 1000 














- Physical Exam


General Appearance: alert, no apparent distress


Respiratory: coarse breath sounds (left side)


Cardiac/Chest: regular rate, rhythm


Extremities: No swelling


Abdomen: normal bowel sounds, non-tender, soft, No distended


Skin: No erythema





ICD10 Worksheet


Patient Problems: 


 Problems











Problem Status Onset


 


Abscess of left lung with pneumonia Acute

## 2017-05-23 NOTE — SOAPPROG
SOAP Progress Note


Assessment/Plan: 


Assessment/Plan:


- 48yo M c L sided pna, abscesses, large pneumatocele


- Continues to progress clinically


- No pain, cough improved. No fevers. Lungs clear although diminished at L 

base. 


- WBC down to 12 today


- Feel that conservative management is working, would cont abx PO vs IV. Will 

have him follow up with me in clinic, will follow pneumatocele and if persists 

will plan resection electively. 


05/22/17 14:59





05/23/17 11:30





Subjective: 





Doing well, watching television. 


Objective: 





 Vital Signs











Temp Pulse Resp BP Pulse Ox


 


 36.8 C   82   18   114/62   96 


 


 05/23/17 07:38  05/23/17 07:38  05/23/17 07:38  05/23/17 07:38  05/23/17 07:38








 Microbiology











 05/21/17 17:30  - Final





 Sputum, Expectorated 








 Laboratory Results





 05/23/17 04:11 





 05/21/17 04:15 





 











 05/22/17 05/23/17 05/24/17





 05:59 05:59 05:59


 


Intake Total 1960 1600 


 


Output Total 300 600 


 


Balance 1660 1000 








 











PT  15.3 SEC (12.0-15.0)  H  05/20/17  15:37    


 


INR  1.24  (0.83-1.16)  H  05/20/17  15:37    














ICD10 Worksheet


Patient Problems: 


 Problems











Problem Status Onset


 


Abscess of left lung with pneumonia Acute

## 2017-05-24 VITALS — SYSTOLIC BLOOD PRESSURE: 108 MMHG | DIASTOLIC BLOOD PRESSURE: 64 MMHG | TEMPERATURE: 98.2 F | RESPIRATION RATE: 16 BRPM

## 2017-05-24 VITALS — OXYGEN SATURATION: 97 % | HEART RATE: 76 BPM

## 2017-05-24 RX ADMIN — IPRATROPIUM BROMIDE AND ALBUTEROL SULFATE SCH ML: .5; 3 SOLUTION RESPIRATORY (INHALATION) at 11:30

## 2017-05-24 RX ADMIN — AMPICILLIN AND SULBACTAM SCH MLS: 2; 1 INJECTION, POWDER, FOR SOLUTION INTRAMUSCULAR; INTRAVENOUS at 05:20

## 2017-05-24 RX ADMIN — IPRATROPIUM BROMIDE AND ALBUTEROL SULFATE SCH ML: .5; 3 SOLUTION RESPIRATORY (INHALATION) at 05:26

## 2017-05-24 RX ADMIN — AMPICILLIN AND SULBACTAM SCH MLS: 2; 1 INJECTION, POWDER, FOR SOLUTION INTRAMUSCULAR; INTRAVENOUS at 11:18

## 2017-05-24 NOTE — PCMIDPN
Assessment/Plan: 





Multilobated large lung abscess and necrotizing PNA L side, significant 

improvement.  No empyema. Poor dentition, likely source


--Augmentin 875mg PO BID x 4 weeks


--f/u ID clinic 6/8 at 3:30pm


--dc picc line before discharge








Subjective: 





still with some night sweats


feeling much better


Objective: 


 Vital Signs











Temp Pulse Resp BP Pulse Ox


 


 36.8 C   84   16   108/64   95 


 


 05/24/17 07:24  05/24/17 07:24  05/24/17 07:24  05/24/17 07:24  05/24/17 07:24








 Microbiology











 05/21/17 17:30  - Final





 Sputum, Expectorated Sputum Culture - Final








 Laboratory Results





 05/23/17 04:11 





 05/21/17 04:15 





 











 05/23/17 05/24/17 05/25/17





 05:59 05:59 05:59


 


Intake Total 1600 1300 700


 


Output Total 600 1900 


 


Balance 1000 -600 700














- Physical Exam


General Appearance: alert, no apparent distress


EENT: No scleral icterus


Respiratory: coarse breath sounds (L base), No accessory muscle use


Cardiac/Chest: regular rate, rhythm


Extremities: No pedal edema


Skin: other (extensive tatoos), No rash


Neuro/Psych: alert, normal mood/affect





ICD10 Worksheet


Patient Problems: 


 Problems











Problem Status Onset


 


Abscess of left lung with pneumonia Acute

## 2017-05-24 NOTE — GDS
[f 
rep st]



                                                             DISCHARGE SUMMARY





DISCHARGE DIAGNOSES:  

1.  Multilobulated lung abscess and necrotizing pneumonia.

2.  Sepsis, improved.

3.  Resolved fever.

4.  History of alcohol abuse.



HOSPITAL COURSE AND STAY BY PROBLEMS:  Multilobulated lung abscess and 
necrotizing pneumonia, left lower lobe:  The patient was admitted to the 
hospital after he presented to the emergency department on 05/20/2017 
complaining of shortness of breath, generalized malaise and fatigue.  A CT of 
the chest done on admission showed 11.1 cm left lower lobe intraparenchymal 
abscess with extensive infiltrates involving the left lower lobe, as well as 
some mild ground-glass attenuation pneumonitis within the left upper lobe.  Dr. Carrera from General Surgery was consulted who did not recommend VATS.  He 
was seen by Infectious Disease, who initially recommended a 2-week course of IV 
antibiotics.  He has been treated with IV Unasyn while in the hospital.  A PICC 
line was placed on 05/23/2017 in anticipation for discharge to complete 2 weeks 
of IV ertapenem.  On 05/24/2017, after a long discussion with Dr. Rivera, as 
well as the case management department, we have opted to proceed with 2 weeks 
of treatment with oral Augmentin.  The patient does not have insurance making 
it hard and cost prohibitive for him to continue with outpatient IV 
antibiotics.  After review of the medical literature, it appears that oral 
Augmentin therapy is a reasonable alternative to IV antibiotic infusion. 



On the day of discharge, the patient is agreeable to the plan to complete 2 
weeks of antibiotics and plans to follow up with the infectious disease clinic 
where he will have a repeat chest x-ray in 3 weeks.  He understands to seek 
medical attention if his condition worsens or if he has recurrent fevers, chest 
pain, or shortness of breath.



PHYSICAL EXAM:  VITAL SIGNS:  On the day of discharge, blood pressure 108/64, 
pulse of 84, respiratory rate 16, O2 saturation 95% on room air.  Temperature 
afebrile.  GENERAL:  No acute distress.  HEART:  S1, S2.  LUNGS:  Clear with 
decreased breath sounds in the left base.  ABDOMEN:  Soft.  EXTREMITIES:  No 
edema.



PERTINENT LABS AND STUDIES:  Chest CT done 05/20/2017, refer to report.



DISCHARGE MEDICATIONS:  Please refer to discharge medication reconciliation in 
[facility] for full details.  Below is a preliminary list. 



New medications on hospital discharge:  Augmentin 875 mg p.o. b.i.d., to 
complete a 2-week course.



DISCHARGE INSTRUCTIONS:  The patient will be discharged from the hospital where 
he plans to follow up with Dr. Rivera as scheduled.  He should have a repeat 
chest x-ray done in 3 weeks.  Once again, he should seek medical attention if 
his symptoms of pneumonia return.



I spent greater than 30 minutes on the discharge of this patient



Job #:  [id number]

MTDD

## 2019-02-25 ENCOUNTER — HOSPITAL ENCOUNTER (EMERGENCY)
Dept: HOSPITAL 80 - CED | Age: 49
Discharge: HOME | End: 2019-02-25
Payer: COMMERCIAL

## 2019-02-25 VITALS — SYSTOLIC BLOOD PRESSURE: 146 MMHG | DIASTOLIC BLOOD PRESSURE: 105 MMHG
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
## 2019-02-25 DIAGNOSIS — Z23: ICD-10-CM
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
## 2019-02-25 DIAGNOSIS — S92.424A: Primary | ICD-10-CM

## 2019-02-25 PROCEDURE — 2W3QX1Z IMMOBILIZATION OF RIGHT LOWER LEG USING SPLINT: ICD-10-PCS | Performed by: EMERGENCY MEDICINE

## 2019-02-25 NOTE — EDPHY
H & P


Time Seen by Provider: 02/25/19 17:09


HPI/ROS: 





CHIEF COMPLAINT:  Right great toe injury





HISTORY OF PRESENT ILLNESS:  Patient is a 49-year-old male presents emergency 

department after injuring his right great toe.  The patient was loading a 

bobcat on her trailer.  The ramp of the trailer struck his right great toe.  He 

now has pain at the base of his toenail.  There is mild bleeding.  He is able 

to ambulate.  Pain is mild.  No numbness or tingling.





REVIEW OF SYSTEMS:  


Negative


Past Medical/Surgical History: 





Includes Pneumonia


Smoking Status: Light smoker


Physical Exam: 





General Appearance:  Alert and no distress.


Head:  Pupils equal.  Normal.


Respiratory:  No respiratory distress.


Cardiac:  regular rate and rhythm.


Extremities:  Right foot:  Patient is large the base of his right great toe 

nail from the nail bed.  The rest the nail bed appears intact.  There is no 

subungual hematoma.  There is no significant swelling of the toe.  He has brisk 

capillary refill with normal sensation.  There is no foot bony tenderness to 

palpation. 


Skin:  No rashes or lesions.


Neuro:  Alert.  Normal mood and affect.


Constitutional: 


 Initial Vital Signs











Temperature (C)  36.6 C   02/25/19 16:51


 


Heart Rate  78   02/25/19 16:51


 


Respiratory Rate  18   02/25/19 16:51


 


Blood Pressure  148/86 H  02/25/19 16:51


 


O2 Sat (%)  95   02/25/19 16:51








 











O2 Delivery Mode               Room Air














Allergies/Adverse Reactions: 


 





No Known Allergies Allergy (Unverified 02/25/19 16:55)


 








Home Medications: 














 Medication  Instructions  Recorded


 


Cephalexin [Keflex (*)] 500 mg PO QID 5 Days  cap 02/25/19


 


Hydrocodone/APAP 5/325 [Norco 1 - 2 tab PO Q4 #13 tab 02/25/19





5/325 (RX)]  














Medical Decision Making





- Diagnostics


Imaging Results: 


 Imaging Impressions





Toe X-Ray  02/25/19 17:14


Impression: Comminuted fracture of the distal phalanx of the right first toe.











ED Course/Re-evaluation: 





In the emergency department I discussed injury with the patient.  Answered all 

his questions.  An x-ray was ordered.





X-ray right great toe:  Please refer the dictated report.  Patient has 

comminuted fracture of his distal phalanx on the right 1st toe.  No visible 

foreign body





I discussed the results with the patient.  At this time I do not feel it is in 

the patient's best interest to remove his nail.  Most the nail bed appears 

intact.  I discussed the possibility of his nail falling off in the future.  

Patient's wound was cleaned and dressed.  Patient was placed in a short leg 

posterior Ortho Glass splint by the ED tech.  Patient was neurovascularly 

intact distally post splint placement.  Patient was given Keflex 500 mg orally.

  He is given a prescription of Keflex.  I discussed the case with Orthopedics.








Differential Diagnosis: 





My differential includes but is not limited to nail bed injury, nail injury, 

fracture, dislocation, subungual hematoma, laceration





Departure





- Departure


Disposition: Home, Routine, Self-Care


Clinical Impression: 


Crush injury, toe


Qualifiers:


 Encounter type: initial encounter Laterality: right Qualified Code(s): 

S97.101A - Crushing injury of unspecified right toe(s), initial encounter





Toe fracture, right


Qualifiers:


 Encounter type: initial encounter Toe: great toe Fracture type: open Phalanx: 

distal Fracture alignment: nondisplaced Qualified Code(s): S92.424B - 

Nondisplaced fracture of distal phalanx of right great toe, initial encounter 

for open fracture





Condition: Good


Instructions:  Toe Fracture (ED), Crush Injury (ED)


Additional Instructions: 


Keep your splint in place.  You should not take the splint off.  Treat your 

splint like a cast.  Go to Dr. Simmons's (orthopedics) office at 745am tomorrow 

morning to be seen.  Take your entire course of antibiotics


Referrals: 


Jensen Carlson MD [Medical Doctor] - 1 day without fail


Prescriptions: 


Cephalexin [Keflex (*)] 500 mg PO QID 5 Days  cap


Hydrocodone/APAP 5/325 [Norco 5/325 (RX)] 1 - 2 tab PO Q4 #13 tab

## 2019-02-26 ENCOUNTER — HOSPITAL ENCOUNTER (EMERGENCY)
Dept: HOSPITAL 80 - CED | Age: 49
Discharge: HOME | End: 2019-02-26
Payer: COMMERCIAL

## 2019-02-26 VITALS — DIASTOLIC BLOOD PRESSURE: 92 MMHG | SYSTOLIC BLOOD PRESSURE: 155 MMHG
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
## 2019-02-26 DIAGNOSIS — S92.492D: Primary | ICD-10-CM
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
## 2019-02-26 NOTE — EDPHY
H & P


Time Seen by Provider: 02/26/19 13:15


HPI/ROS: 





48 yo M with comminuted toe fracture, seen yesterday returns today for bleeding 

through bandage.


Pt has been walking on orthoglass splint.





Review of systems


As per HPI


General no fever no chills no weakness


HEENT no eye pain no eye discharge. No eye redness, no sore throat


Respiratory no cough, no shortness of breath


Cardiac no chest pain, no peripheral edema


GI no abdominal pain, no diarrhea, no constipation, no nausea, no vomiting


  no flank pain, no hematuria, no dysuria


Musculoskeletal no myalgias, pos joint pain


Heme  no easy bruising, no easy bleeding


Endo no polyuria, no polydipsia


Skin no rashes, no pruritus


Neuro no syncope, no dizziness, no headaches


Psych is no suicidal ideation, no homicidal ideation





Past Medical/Surgical History: 





non contributory


Social History: 





na


Smoking Status: Light smoker


Physical Exam: 





49-year-old male alert and oriented no acute distress nontoxic appearance, 

afebrile


Atraumatic normocephalic


Lungs clear to auscultation


No respiratory distress


Heart regular rate and rhythm


Left foot left great toe injury, bleeding controlled





Constitutional: 


 Initial Vital Signs











Temperature (C)  36.7 C   02/26/19 13:20


 


Heart Rate  74   02/26/19 13:20


 


Respiratory Rate  18   02/26/19 13:20


 


Blood Pressure  155/92 H  02/26/19 13:20


 


O2 Sat (%)  95   02/26/19 13:20








 











O2 Delivery Mode               Room Air














Allergies/Adverse Reactions: 


 





No Known Allergies Allergy (Verified 02/26/19 13:20)


 








Home Medications: 














 Medication  Instructions  Recorded


 


Cephalexin [Keflex (*)] 500 mg PO QID 5 Days  cap 02/25/19


 


Hydrocodone/APAP 5/325 [Norco 1 - 2 tab PO Q4 #13 tab 02/25/19





5/325 (RX)]  














Medical Decision Making


ED Course/Re-evaluation: 





Patient seen and evaluated for bleeding through current dressing.





Bleeding controlled


Surgicel added


Dressing changed


Splint replaced





Impression


Comminuted toe fracture with bleeding





Plan


Elevate, rest, ice


Crutches no weight-bearing


Keep follow up this Thursday with Orthopedics


Differential Diagnosis: 





Differential diagnosis


Needs dressing change





Departure





- Departure


Disposition: Home, Routine, Self-Care


Clinical Impression: 


 Dressing change





Condition: Good


Additional Instructions: 


keep your follow up as planned


spend more time with foot elevated, no weight bearing


Referrals: 


NONE *PRIMARY CARE P,. [Primary Care Provider] - As per Instructions


Jensen Carlson MD [Medical Doctor] - As per Instructions